# Patient Record
Sex: MALE | Race: WHITE | NOT HISPANIC OR LATINO | Employment: FULL TIME | URBAN - METROPOLITAN AREA
[De-identification: names, ages, dates, MRNs, and addresses within clinical notes are randomized per-mention and may not be internally consistent; named-entity substitution may affect disease eponyms.]

---

## 2017-08-28 ENCOUNTER — HISTORICAL (OUTPATIENT)
Dept: LAB | Facility: HOSPITAL | Age: 52
End: 2017-08-28

## 2017-12-27 ENCOUNTER — HISTORICAL (OUTPATIENT)
Dept: ADMINISTRATIVE | Facility: HOSPITAL | Age: 52
End: 2017-12-27

## 2018-01-01 LAB — FINAL CULTURE: NORMAL

## 2018-01-02 ENCOUNTER — HISTORICAL (OUTPATIENT)
Dept: ADMINISTRATIVE | Facility: HOSPITAL | Age: 53
End: 2018-01-02

## 2018-01-08 ENCOUNTER — HISTORICAL (OUTPATIENT)
Dept: ADMINISTRATIVE | Facility: HOSPITAL | Age: 53
End: 2018-01-08

## 2018-02-05 LAB — FINAL CULTURE: NORMAL

## 2018-03-29 ENCOUNTER — HISTORICAL (OUTPATIENT)
Dept: RADIOLOGY | Facility: HOSPITAL | Age: 53
End: 2018-03-29

## 2018-06-26 ENCOUNTER — HISTORICAL (OUTPATIENT)
Dept: RADIOLOGY | Facility: HOSPITAL | Age: 53
End: 2018-06-26

## 2018-11-26 ENCOUNTER — HISTORICAL (OUTPATIENT)
Dept: ADMINISTRATIVE | Facility: HOSPITAL | Age: 53
End: 2018-11-26

## 2018-12-28 ENCOUNTER — HISTORICAL (OUTPATIENT)
Dept: RADIOLOGY | Facility: HOSPITAL | Age: 53
End: 2018-12-28

## 2020-07-16 ENCOUNTER — HISTORICAL (OUTPATIENT)
Dept: RADIOLOGY | Facility: HOSPITAL | Age: 55
End: 2020-07-16

## 2020-10-09 PROBLEM — K56.609 SMALL BOWEL OBSTRUCTION: Status: ACTIVE | Noted: 2020-10-09

## 2022-04-07 ENCOUNTER — HISTORICAL (OUTPATIENT)
Dept: ADMINISTRATIVE | Facility: HOSPITAL | Age: 57
End: 2022-04-07

## 2022-04-24 VITALS
DIASTOLIC BLOOD PRESSURE: 80 MMHG | WEIGHT: 262.81 LBS | BODY MASS INDEX: 34.83 KG/M2 | OXYGEN SATURATION: 97 % | HEIGHT: 73 IN | SYSTOLIC BLOOD PRESSURE: 118 MMHG

## 2022-04-30 NOTE — OP NOTE
* Final Report *  Bronchoscopy Procedure with BAL/biopsy     Patient:   Vikas Sullivan            MRN: [id number]            FIN: [id number]               Age:   52 years     Sex:  Male     :  1965   Associated Diagnoses:   Pneumonia in systemic mycoses   Author:   Donnie Vásquez MD      Procedure   Bronchoscopy procedure   Date/ Time:  2018 12:23:00.     Confirmed: patient, procedure, side, site, safety procedures followed.     Performed by: self.     Informed consent: signed by patient.     Indication: abnormal chest x-ray, biopsy transbronchial, pneumonia.     Preparation: NPO, pre-medications given moderate sedation, pre-oxygenation  100  %.     Technique: type of bronchoscope flexible, route transnasal, monitoring during procedure (blood pressure monitoring, cardiac monitor, continuous pulse oximetry).     Findings: Normal vocal cords and trachea  Normal bronchial anatomy on right and left side  BAL was taken from the posterior segment of the left lower lobe  Biopsies ×5 were then taken from the left lower lobe posterior segment, with minimal bleeding  Hemostasis verified prior to scope removal.     Procedure tolerated: well.     Complications: None.        Impression and Plan   Diagnosis     Pneumonia in systemic mycoses (TMC64-CZ B48.8).     Education and Follow-up:       Counseled: Patient, Family.      Result type: Procedure Note  Result date: 2018 12:22 CST  Result status: Auth (Verified)  Result title: Bronchoscopy Procedure with BAL/biopsy  Performed by: Donnie Vásquez MD on 2018 12:25 CST  Verified by: Donnie Vásquez MD on 2018 12:25 CST  Encounter info: [id number], [facility], Day Surgery, 2018 - 2018

## 2022-05-01 NOTE — HISTORICAL [facility] CERNER
This is a historical note converted from Dread. Formatting and pictures may have been removed.  Please reference Dread for original formatting and attached multimedia. Chief Complaint  Dx with intra-abdominal abscess  History of Present Illness  53 yo male with history of abdominal abscess from uncertain etiology which started approximately 8 years ago at which time he underwent ex lap and drainage and subsequent repeat drainage and ANTHONY drain placement and saw ID at that time who felt this was secondary to a mycobacterial infection as it was unable to ever be grown in culture.? Was treated for approximately 2 years with Biaxin, Avelox and another antimicrobial and felt much better however has been off of his antimicrobials almost two years and approximately a month ago he started having fevers/chills/night sweats and abdominal pain again.?  ?  He and his wife concerned that he is having recurrence of his disease/abdominal abscess  ?  Multiple environmental exposures, worked as a , hunts, fishes, has pets  Review of Systems  Full 14 point ROS performed, all negative except as mentioned in HPI  Physical Exam  Vitals & Measurements  T:?36.8? ?C ?(Oral)? HR:?64?(Peripheral)? RR:?20? BP:?145/90?  HT:?187?cm? HT:?187?cm? WT:?122.8?kg? WT:?122.8?kg? BMI:?35.12?  ????PE: Gen: AAOx3 in nad, comfortable  ????HEENT: no thrush  ????Neck: supple  ????CVS: RRR no m/r/g  ????C/L: CTA bilaterally  ????Abd: soft,?mild tenderness to palpation in the left upper quadrant, midline incision with well healed scar  ????Ext: no c/c/e  ????Skin: no rashes/nodules noted  ??????Neuro:? CN 2-12 grossly intact  Assessment/Plan  Fever  ?Etiology unclear however concerned at this point that this could be a recrudescence of his intra-abdominal abscess  CT a/p today with IV contrast  Labs as listed below  Will call with results  Ordered:  Bartonella DNA Detection by PCR-[facility] [id number], Routine collect, 12/27/17 13:56:00 CST, Blood, Stop date  12/27/17 13:56:00 CST, Lab Collect, Intraabdominal fluid collection  Fever  Night sweats, 12/27/17 13:56:00 CST  Blood Culture, 12/27/17 13:56:00 CST, Routine collect, Blood, AC R, Once-NOW, for 2, dose(s), Stop date 12/27/17 14:10:00 CST, Intraabdominal fluid collection  Fever  Night sweats  Blood Culture for Acid Fast Bacilli-[facility], Routine collect, [facility] Review, 12/27/17 13:56:00 CST, Stop date 12/27/17 13:56:00 CST, Lab Collect, Intraabdominal fluid collection  Fever  Night sweats  Brucella Ab (Total) by Agglutination-[facility] [id number], Routine collect, 12/27/17 13:56:00 CST, Blood, Stop date 12/27/17 13:56:00 CST, Lab Collect, Intraabdominal fluid collection  Fever  Night sweats, 12/27/17 13:56:00 CST  CT Abdomen and Pelvis W Contrast, Routine, 12/27/17 14:47:00 CST, Other (please specify), intra-abdominal fluid collection, fever, night sweats, None, Ambulatory, Patient Has IV?, Rad Type, Intraabdominal fluid collection  Fever  Night sweats, Schedule this test, [facility]...  HIV 1 and 2, Routine collect, 12/27/17 13:56:00 CST, Blood, Stop date 12/27/17 13:56:00 CST, Lab Collect, Intraabdominal fluid collection  Fever  Night sweats, 12/27/17 13:56:00 CST  Office/Outpatient Visit Level 4 [facility] [id number] PC, Fever  Intraabdominal fluid collection  Night sweats, [facility], 12/27/17 14:32:00 CST  Q-Fever Ab IgG, Phase I & II-[facility], Routine collect, 12/27/17 13:56:00 CST, Blood, Stop date 12/27/17 13:56:00 CST, Lab Collect, Intraabdominal fluid collection  Fever  Night sweats, 12/27/17 13:56:00 CST  ?  Intraabdominal fluid collection  Ordered:  Bartonella DNA Detection by PCR-[facility] [id number], Routine collect, 12/27/17 13:56:00 CST, Blood, Stop date 12/27/17 13:56:00 CST, Lab Collect, Intraabdominal fluid collection  Fever  Night sweats, 12/27/17 13:56:00 CST  Blood Culture, 12/27/17 13:56:00 CST, Routine collect, Blood, AC R, Once-NOW, for 2, dose(s), Stop date 12/27/17 14:10:00 CST, Intraabdominal  fluid collection  Fever  Night sweats  Blood Culture for Acid Fast Bacilli-[facility], Routine collect, [facility] Review, 12/27/17 13:56:00 CST, Stop date 12/27/17 13:56:00 CST, Lab Collect, Intraabdominal fluid collection  Fever  Night sweats  Brucella Ab (Total) by Agglutination-[facility] [id number], Routine collect, 12/27/17 13:56:00 CST, Blood, Stop date 12/27/17 13:56:00 CST, Lab Collect, Intraabdominal fluid collection  Fever  Night sweats, 12/27/17 13:56:00 CST  CT Abdomen and Pelvis W Contrast, Routine, 12/27/17 14:47:00 CST, Other (please specify), intra-abdominal fluid collection, fever, night sweats, None, Ambulatory, Patient Has IV?, Rad Type, Intraabdominal fluid collection  Fever  Night sweats, Schedule this test, [facility]...  HIV 1 and 2, Routine collect, 12/27/17 13:56:00 CST, Blood, Stop date 12/27/17 13:56:00 CST, Lab Collect, Intraabdominal fluid collection  Fever  Night sweats, 12/27/17 13:56:00 CST  Office/Outpatient Visit Level 4 [facility] [id number] PC, Fever  Intraabdominal fluid collection  Night sweats, [facility], 12/27/17 14:32:00 CST  Q-Fever Ab IgG, Phase I & II-[facility], Routine collect, 12/27/17 13:56:00 CST, Blood, Stop date 12/27/17 13:56:00 CST, Lab Collect, Intraabdominal fluid collection  Fever  Night sweats, 12/27/17 13:56:00 CST  ?  Night sweats  Ordered:  Bartonella DNA Detection by PCR-[facility] [id number], Routine collect, 12/27/17 13:56:00 CST, Blood, Stop date 12/27/17 13:56:00 CST, Lab Collect, Intraabdominal fluid collection  Fever  Night sweats, 12/27/17 13:56:00 CST  Blood Culture, 12/27/17 13:56:00 CST, Routine collect, Blood, AC R, Once-NOW, for 2, dose(s), Stop date 12/27/17 14:10:00 CST, Intraabdominal fluid collection  Fever  Night sweats  Blood Culture for Acid Fast Bacilli-[facility], Routine collect, [facility] Review, 12/27/17 13:56:00 CST, Stop date 12/27/17 13:56:00 CST, Lab Collect, Intraabdominal fluid collection  Fever  Night sweats  Brucella Ab (Total) by Agglutination-[facility]  [id number], Routine collect, 12/27/17 13:56:00 CST, Blood, Stop date 12/27/17 13:56:00 CST, Lab Collect, Intraabdominal fluid collection  Fever  Night sweats, 12/27/17 13:56:00 CST  CT Abdomen and Pelvis W Contrast, Routine, 12/27/17 14:47:00 CST, Other (please specify), intra-abdominal fluid collection, fever, night sweats, None, Ambulatory, Patient Has IV?, Rad Type, Intraabdominal fluid collection  Fever  Night sweats, Schedule this test, [facility]...  HIV 1 and 2, Routine collect, 12/27/17 13:56:00 CST, Blood, Stop date 12/27/17 13:56:00 CST, Lab Collect, Intraabdominal fluid collection  Fever  Night sweats, 12/27/17 13:56:00 CST  Office/Outpatient Visit Level 4 [place] [id number] PC, Fever  Intraabdominal fluid collection  Night sweats, [facility], 12/27/17 14:32:00 CST  Q-Fever Ab IgG, Phase I & II-[facility], Routine collect, 12/27/17 13:56:00 CST, Blood, Stop date 12/27/17 13:56:00 CST, Lab Collect, Intraabdominal fluid collection  Fever  Night sweats, 12/27/17 13:56:00 CST  ?   Problem List/Past Medical History  Ongoing  Obesity  poorly healing wound old hernia site  Wears glasses  Historical  ear infections in past  incisional hernia  Procedure/Surgical History  Reclosure of postoperative disruption of abdominal wall (06/28/2012)  Secondary closure of surgical wound or dehiscence, extensive or complicated. (06/28/2012)  Incisional hernia repair (2007)  drainage of abscess on diaphragm (2006)  Medications  aspirin 81 mg oral tablet, 81 mg= 1 tab(s), Oral, Daily  biotin, Oral, As Directed  Multivitamin Tab (with minerals), Oral, As Directed  Vitamin D3 1000 intl units oral capsule, Oral, As Directed  Allergies  No Known Allergies  Social History  Alcohol - 06/26/2012  Current, Beer, 1-2 times per year  Tobacco - 06/26/2012  Past, Cigarettes, 5 per day. 5 year(s). Total pack years: 1. Started age 16.0 Years. Stopped age 23 Years.  Lab Results  reviewed